# Patient Record
Sex: MALE | Race: OTHER | NOT HISPANIC OR LATINO | Employment: UNEMPLOYED | URBAN - METROPOLITAN AREA
[De-identification: names, ages, dates, MRNs, and addresses within clinical notes are randomized per-mention and may not be internally consistent; named-entity substitution may affect disease eponyms.]

---

## 2021-06-30 ENCOUNTER — APPOINTMENT (EMERGENCY)
Dept: CT IMAGING | Facility: HOSPITAL | Age: 46
End: 2021-06-30
Payer: COMMERCIAL

## 2021-06-30 ENCOUNTER — HOSPITAL ENCOUNTER (EMERGENCY)
Facility: HOSPITAL | Age: 46
Discharge: HOME | End: 2021-06-30
Attending: EMERGENCY MEDICINE | Admitting: EMERGENCY MEDICINE
Payer: COMMERCIAL

## 2021-06-30 VITALS
SYSTOLIC BLOOD PRESSURE: 141 MMHG | DIASTOLIC BLOOD PRESSURE: 82 MMHG | HEIGHT: 66 IN | OXYGEN SATURATION: 100 % | BODY MASS INDEX: 28.93 KG/M2 | HEART RATE: 63 BPM | RESPIRATION RATE: 16 BRPM | TEMPERATURE: 98.8 F | WEIGHT: 180 LBS

## 2021-06-30 DIAGNOSIS — E86.0 MILD DEHYDRATION: Primary | ICD-10-CM

## 2021-06-30 DIAGNOSIS — N17.9 AKI (ACUTE KIDNEY INJURY) (HCC): ICD-10-CM

## 2021-06-30 LAB
ALBUMIN SERPL BCP-MCNC: 4.4 G/DL (ref 3.4–4.8)
ALP SERPL-CCNC: 58.3 U/L (ref 10–129)
ALT SERPL W P-5'-P-CCNC: 18 U/L (ref 5–63)
ANION GAP SERPL CALCULATED.3IONS-SCNC: 7 MMOL/L (ref 4–13)
AST SERPL W P-5'-P-CCNC: 24 U/L (ref 15–41)
ATRIAL RATE: 59 BPM
BASOPHILS # BLD AUTO: 0.05 THOUSANDS/ΜL (ref 0–0.1)
BASOPHILS NFR BLD AUTO: 1 % (ref 0–1)
BILIRUB SERPL-MCNC: 0.72 MG/DL (ref 0.3–1.2)
BUN SERPL-MCNC: 27 MG/DL (ref 6–20)
CALCIUM SERPL-MCNC: 9.3 MG/DL (ref 8.4–10.2)
CHLORIDE SERPL-SCNC: 104 MMOL/L (ref 96–108)
CO2 SERPL-SCNC: 24 MMOL/L (ref 22–33)
CREAT SERPL-MCNC: 1.39 MG/DL (ref 0.5–1.2)
EOSINOPHIL # BLD AUTO: 0.29 THOUSAND/ΜL (ref 0–0.61)
EOSINOPHIL NFR BLD AUTO: 3 % (ref 0–6)
ERYTHROCYTE [DISTWIDTH] IN BLOOD BY AUTOMATED COUNT: 17.8 % (ref 11.6–15.1)
GFR SERPL CREATININE-BSD FRML MDRD: 61 ML/MIN/1.73SQ M
GLUCOSE SERPL-MCNC: 100 MG/DL (ref 65–140)
HCT VFR BLD AUTO: 42.9 % (ref 36.5–49.3)
HGB BLD-MCNC: 13.9 G/DL (ref 12–17)
IMM GRANULOCYTES # BLD AUTO: 0.04 THOUSAND/UL (ref 0–0.2)
IMM GRANULOCYTES NFR BLD AUTO: 1 % (ref 0–2)
LYMPHOCYTES # BLD AUTO: 2.99 THOUSANDS/ΜL (ref 0.6–4.47)
LYMPHOCYTES NFR BLD AUTO: 34 % (ref 14–44)
MCH RBC QN AUTO: 21.5 PG (ref 26.8–34.3)
MCHC RBC AUTO-ENTMCNC: 32.4 G/DL (ref 31.4–37.4)
MCV RBC AUTO: 66 FL (ref 82–98)
MONOCYTES # BLD AUTO: 0.72 THOUSAND/ΜL (ref 0.17–1.22)
MONOCYTES NFR BLD AUTO: 8 % (ref 4–12)
NEUTROPHILS # BLD AUTO: 4.78 THOUSANDS/ΜL (ref 1.85–7.62)
NEUTS SEG NFR BLD AUTO: 53 % (ref 43–75)
P AXIS: 27 DEGREES
PLATELET # BLD AUTO: 204 THOUSANDS/UL (ref 149–390)
PMV BLD AUTO: 10.1 FL (ref 8.9–12.7)
POTASSIUM SERPL-SCNC: 4.5 MMOL/L (ref 3.5–5)
PR INTERVAL: 249 MS
PROT SERPL-MCNC: 7.4 G/DL (ref 6.4–8.3)
QRS AXIS: -41 DEGREES
QRSD INTERVAL: 117 MS
QT INTERVAL: 455 MS
QTC INTERVAL: 451 MS
RBC # BLD AUTO: 6.47 MILLION/UL (ref 3.88–5.62)
SODIUM SERPL-SCNC: 135 MMOL/L (ref 133–145)
T WAVE AXIS: 4 DEGREES
TROPONIN I SERPL-MCNC: <0.03 NG/ML (ref 0–0.07)
TSH SERPL DL<=0.05 MIU/L-ACNC: 2.55 UIU/ML (ref 0.34–5.6)
VENTRICULAR RATE: 59 BPM
WBC # BLD AUTO: 8.87 THOUSAND/UL (ref 4.31–10.16)

## 2021-06-30 PROCEDURE — 93005 ELECTROCARDIOGRAM TRACING: CPT

## 2021-06-30 PROCEDURE — 99285 EMERGENCY DEPT VISIT HI MDM: CPT | Performed by: PHYSICIAN ASSISTANT

## 2021-06-30 PROCEDURE — 99285 EMERGENCY DEPT VISIT HI MDM: CPT

## 2021-06-30 PROCEDURE — G1004 CDSM NDSC: HCPCS

## 2021-06-30 PROCEDURE — 70496 CT ANGIOGRAPHY HEAD: CPT

## 2021-06-30 PROCEDURE — 84443 ASSAY THYROID STIM HORMONE: CPT | Performed by: PHYSICIAN ASSISTANT

## 2021-06-30 PROCEDURE — 80053 COMPREHEN METABOLIC PANEL: CPT | Performed by: PHYSICIAN ASSISTANT

## 2021-06-30 PROCEDURE — 84484 ASSAY OF TROPONIN QUANT: CPT | Performed by: PHYSICIAN ASSISTANT

## 2021-06-30 PROCEDURE — 96360 HYDRATION IV INFUSION INIT: CPT

## 2021-06-30 PROCEDURE — 85025 COMPLETE CBC W/AUTO DIFF WBC: CPT | Performed by: PHYSICIAN ASSISTANT

## 2021-06-30 PROCEDURE — 93010 ELECTROCARDIOGRAM REPORT: CPT | Performed by: INTERNAL MEDICINE

## 2021-06-30 PROCEDURE — 70498 CT ANGIOGRAPHY NECK: CPT

## 2021-06-30 PROCEDURE — 36415 COLL VENOUS BLD VENIPUNCTURE: CPT | Performed by: PHYSICIAN ASSISTANT

## 2021-06-30 RX ORDER — PRAVASTATIN SODIUM 20 MG
20 TABLET ORAL DAILY
COMMUNITY

## 2021-06-30 RX ORDER — AMIODARONE HYDROCHLORIDE 200 MG/1
200 TABLET ORAL DAILY
COMMUNITY

## 2021-06-30 RX ORDER — METOPROLOL TARTRATE 100 MG/1
100 TABLET ORAL EVERY 12 HOURS SCHEDULED
COMMUNITY

## 2021-06-30 RX ORDER — LISINOPRIL 10 MG/1
10 TABLET ORAL DAILY
COMMUNITY

## 2021-06-30 RX ADMIN — SODIUM CHLORIDE 1000 ML: 0.9 INJECTION, SOLUTION INTRAVENOUS at 10:14

## 2021-06-30 RX ADMIN — IOHEXOL 85 ML: 350 INJECTION, SOLUTION INTRAVENOUS at 11:27

## 2021-06-30 NOTE — ED PROVIDER NOTES
History  Chief Complaint   Patient presents with    Extremity Weakness     bilat legs weak since last night, also CO dizziness  hx afib and open heart surgery  43-year-old male with history of AFib, CAD, hyperlipidemia comes in today complaining of dizziness occasionally like he is going to pass out and bilateral leg weakness that he reports began at 4:00 p m  yesterday  He endorses a change in his speech which he describes as slow  He has not been having headaches or any difficulty walking  Denies any recent head trauma or falls  He denies any chest pain, shortness of breath, abdominal pain, nausea, vomiting, diarrhea, dysuria  Prior to Admission Medications   Prescriptions Last Dose Informant Patient Reported? Taking?   amiodarone 200 mg tablet 6/30/2021 at Unknown time Self Yes Yes   Sig: Take 200 mg by mouth daily   apixaban (ELIQUIS) 5 mg 6/30/2021 at Unknown time Self Yes Yes   Sig: Take 5 mg by mouth 2 (two) times a day   lisinopril (ZESTRIL) 10 mg tablet 6/30/2021 at Unknown time Self Yes Yes   Sig: Take 10 mg by mouth daily   metoprolol tartrate (LOPRESSOR) 100 mg tablet 6/30/2021 at Unknown time Self Yes Yes   Sig: Take 100 mg by mouth every 12 (twelve) hours   pravastatin (PRAVACHOL) 20 mg tablet 6/29/2021 at Unknown time Self Yes Yes   Sig: Take 20 mg by mouth daily      Facility-Administered Medications: None       Past Medical History:   Diagnosis Date    A-fib ([facility] [address] )     History of transfusion     Hyperlipidemia        Past Surgical History:   Procedure Laterality Date    CARDIAC SURGERY      open heart approx  5 years ago       History reviewed  No pertinent family history  I have reviewed and agree with the history as documented      E-Cigarette/Vaping    E-Cigarette Use Never User      E-Cigarette/Vaping Substances     Social History     Tobacco Use    Smoking status: Never Smoker    Smokeless tobacco: Never Used   Vaping Use    Vaping Use: Never used   Substance Use Topics    Alcohol use: Never    Drug use: Never       Review of Systems   Constitutional: Negative for fatigue and fever  HENT: Negative for ear pain, rhinorrhea and sore throat  Eyes: Negative for visual disturbance  Respiratory: Negative for cough and shortness of breath  Cardiovascular: Negative for chest pain  Gastrointestinal: Negative for abdominal pain, diarrhea, nausea and vomiting  Genitourinary: Negative for dysuria  Musculoskeletal: Negative for back pain  Skin: Negative for rash  Neurological: Positive for dizziness and weakness  Negative for headaches  Psychiatric/Behavioral: Negative for behavioral problems  Physical Exam  Physical Exam  Vitals and nursing note reviewed  Constitutional:       General: He is not in acute distress  Appearance: Normal appearance  He is not ill-appearing or toxic-appearing  HENT:      Head: Normocephalic and atraumatic  Right Ear: External ear normal       Left Ear: External ear normal    Eyes:      Extraocular Movements: Extraocular movements intact  Cardiovascular:      Rate and Rhythm: Regular rhythm  Bradycardia present  Comments: Borderline uriel  Pulmonary:      Effort: Pulmonary effort is normal  No respiratory distress  Breath sounds: Normal breath sounds  Abdominal:      General: Abdomen is flat  Bowel sounds are normal       Palpations: Abdomen is soft  Tenderness: There is no abdominal tenderness  There is no right CVA tenderness or left CVA tenderness  Musculoskeletal:         General: No signs of injury  Normal range of motion  Cervical back: Normal range of motion  Skin:     General: Skin is warm and dry  Neurological:      General: No focal deficit present  Mental Status: He is alert and oriented to person, place, and time  Sensory: No sensory deficit  Motor: No weakness        Gait: Gait normal    Psychiatric:         Mood and Affect: Mood normal          Behavior: Behavior normal          Vital Signs  ED Triage Vitals [06/30/21 0958]   Temperature Pulse Respirations Blood Pressure SpO2   98 8 °F (37 1 °C) 58 16 135/83 100 %      Temp Source Heart Rate Source Patient Position - Orthostatic VS BP Location FiO2 (%)   Oral Monitor Lying Left arm --      Pain Score       --           Vitals:    06/30/21 0958 06/30/21 1230 06/30/21 1231 06/30/21 1232   BP: 135/83 144/85 141/80 141/82   Pulse: 58 56 63    Patient Position - Orthostatic VS: Lying Sitting - Orthostatic VS Standing - Orthostatic VS Lying - Orthostatic VS         Visual Acuity      ED Medications  Medications   sodium chloride 0 9 % bolus 1,000 mL (0 mL Intravenous Stopped 6/30/21 1140)   iohexol (OMNIPAQUE) 350 MG/ML injection (SINGLE-DOSE) 100 mL (85 mL Intravenous Given 6/30/21 1127)       Diagnostic Studies  Results Reviewed     Procedure Component Value Units Date/Time    Comprehensive metabolic panel [[id number]]  (Abnormal) Collected: 06/30/21 1013    Lab Status: Final result Specimen: Blood from Arm, Right Updated: 06/30/21 1114     Sodium 135 mmol/L      Potassium 4 5 mmol/L      Chloride 104 mmol/L      CO2 24 mmol/L      ANION GAP 7 mmol/L      BUN 27 mg/dL      Creatinine 1 39 mg/dL      Glucose 100 mg/dL      Calcium 9 3 mg/dL      AST 24 U/L      ALT 18 U/L      Alkaline Phosphatase 58 3 U/L      Total Protein 7 4 g/dL      Albumin 4 4 g/dL      Total Bilirubin 0 72 mg/dL      eGFR 61 ml/min/1 73sq m     Narrative:      [place] guidelines for Chronic Kidney Disease (CKD):     Stage 1 with normal or high GFR (GFR > 90 mL/min/1 73 square meters)    Stage 2 Mild CKD (GFR = 60-89 mL/min/1 73 square meters)    Stage 3A Moderate CKD (GFR = 45-59 mL/min/1 73 square meters)    Stage 3B Moderate CKD (GFR = 30-44 mL/min/1 73 square meters)    Stage 4 Severe CKD (GFR = 15-29 mL/min/1 73 square meters)    Stage 5 End Stage CKD (GFR <15 mL/min/1 73 square meters)  Note: GFR calculation is accurate only with a steady state creatinine    TSH [[id number]]  (Normal) Collected: 06/30/21 1013    Lab Status: Final result Specimen: Blood from Arm, Right Updated: 06/30/21 1054     TSH 3RD GENERATON 2 554 uIU/mL     Narrative:      Patients undergoing fluorescein dye angiography may retain small amounts of fluorescein in the body for 48-72 hours post procedure  Samples containing fluorescein can produce falsely depressed TSH values  If the patient had this procedure,a specimen should be resubmitted post fluorescein clearance  Troponin I [[id number]]  (Normal) Collected: 06/30/21 1013    Lab Status: Final result Specimen: Blood from Arm, Right Updated: 06/30/21 1042     Troponin I <0 03 ng/mL     CBC and differential [[id number]]  (Abnormal) Collected: 06/30/21 1013    Lab Status: Final result Specimen: Blood from Arm, Right Updated: 06/30/21 1021     WBC 8 87 Thousand/uL      RBC 6 47 Million/uL      Hemoglobin 13 9 g/dL      Hematocrit 42 9 %      MCV 66 fL      MCH 21 5 pg      MCHC 32 4 g/dL      RDW 17 8 %      MPV 10 1 fL      Platelets 328 Thousands/uL      Neutrophils Relative 53 %      Immat GRANS % 1 %      Lymphocytes Relative 34 %      Monocytes Relative 8 %      Eosinophils Relative 3 %      Basophils Relative 1 %      Neutrophils Absolute 4 78 Thousands/µL      Immature Grans Absolute 0 04 Thousand/uL      Lymphocytes Absolute 2 99 Thousands/µL      Monocytes Absolute 0 72 Thousand/µL      Eosinophils Absolute 0 29 Thousand/µL      Basophils Absolute 0 05 Thousands/µL                  CTA head and neck with and without contrast   Final Result by Echo Constantino DO (06/30 1212)      CT brain: No acute intracranial abnormality  CT angiography: No stenosis, occlusion or thrombosis of the cervical vasculature  Right vertebral artery is mildly hypoplastic but patent and consistent in caliber                    Workstation performed: [id number]                    Procedures  Procedures ED Course  ED Course as of Jul 01 0943 Wed Jun 30, 2021   1005 EKG performed at 9:55 a m  Interpreted by me shows sinus bradycardia with a rate of 59 beats per minute, left axis deviation related to left ventricular hypertrophy, first-degree AV block, no significant ST elevations      1155 Patient reports he is feeling better      1223 Baseline around 1 1   Creatinine(!): 1 39   1224 6 months ago was 79   eGFR: 61                 Stroke Assessment     Row Name 06/30/21 0952             NIH Stroke Scale    Interval  --      Level of Consciousness (1a )  0      LOC Questions (1b )  0      LOC Commands (1c )  0      Best Gaze (2 )  0      Visual (3 )  0      Facial Palsy (4 )  0      Motor Arm, Left (5a )  0      Motor Arm, Right (5b )  0      Motor Leg, Left (6a )  0      Motor Leg, Right (6b )  0      Limb Ataxia (7 )  0      Sensory (8 )  0      Best Language (9 )  0      Dysarthria (10 )  0      Extinction and Inattention (11 ) (Formerly Neglect)  0      Total  0          First Filed Value   TPA Decision  Patient not a TPA candidate  Patient is not a candidate options  Symptoms resolved/clearly non disabling  MDM  Number of Diagnoses or Management Options  LEANNE (acute kidney injury) ([facility] [address] )  Mild dehydration  Diagnosis management comments: 73-year-old male with no focal deficits on exam coming in with complaints of leg weakness and lightheadedness that began yesterday  Head CT does not show any signs of acute stroke  Patient is feeling much better after IV fluids  His creatinine was mildly elevated  Patient did not feel he needed to stay in the hospital   Patient advised to go home and follow-up with his PCP to have his creatinine rechecked  Advised to hydrate until his urine is clear  Portions of the record may have been created with voice recognition software   Occasional wrong word or "sound a like" substitutions may have occurred due to the inherent limitations of voice recognition software  Read the chart carefully and recognize, using context, where substitutions have occurred  Disposition  Final diagnoses:   Mild dehydration   LEANNE (acute kidney injury) ([address] )     Time reflects when diagnosis was documented in both MDM as applicable and the Disposition within this note     Time User Action Codes Description Comment    6/30/2021 12:25 PM Lancaster Harder Add [E86 0] Mild dehydration     6/30/2021 12:25 PM Lancaster Harder Add [N17 9] LEANNE (acute kidney injury) [facility])       ED Disposition     ED Disposition Condition Date/Time Comment    Discharge Stable Wed Jun 30, 2021 12:31 PM Luis F Villanueva discharge to home/self care  Follow-up Information     Follow up With Specialties Details Why Contact Info Additional Information    Ailyn Chilel MD Cardiology Schedule an appointment as soon as possible for a visit   [address] [facility] Medicine Schedule an appointment as soon as possible for a visit   [address] [phone]  [address] , [place], [place], [address]    [address]  Call  As needed [phone]             Discharge Medication List as of 6/30/2021 12:31 PM      CONTINUE these medications which have NOT CHANGED    Details   amiodarone 200 mg tablet Take 200 mg by mouth daily, Historical Med      apixaban (ELIQUIS) 5 mg Take 5 mg by mouth 2 (two) times a day, Historical Med      lisinopril (ZESTRIL) 10 mg tablet Take 10 mg by mouth daily, Historical Med      metoprolol tartrate (LOPRESSOR) 100 mg tablet Take 100 mg by mouth every 12 (twelve) hours, Historical Med      pravastatin (PRAVACHOL) 20 mg tablet Take 20 mg by mouth daily, Historical Med           No discharge procedures on file      PDMP Review     None          ED Provider  Electronically Signed by           Ezekiel Cabello JEFF  07/01/21 [address], JEFF  07/01/21 6019

## 2022-11-05 ENCOUNTER — OFFICE VISIT (OUTPATIENT)
Dept: URGENT CARE | Facility: CLINIC | Age: 47
End: 2022-11-05

## 2022-11-05 VITALS
HEIGHT: 66 IN | WEIGHT: 185 LBS | OXYGEN SATURATION: 99 % | SYSTOLIC BLOOD PRESSURE: 133 MMHG | HEART RATE: 65 BPM | DIASTOLIC BLOOD PRESSURE: 75 MMHG | BODY MASS INDEX: 29.73 KG/M2

## 2022-11-05 DIAGNOSIS — R11.0 NAUSEA: Primary | ICD-10-CM

## 2022-11-05 RX ORDER — ONDANSETRON 4 MG/1
4 TABLET, ORALLY DISINTEGRATING ORAL EVERY 6 HOURS PRN
Qty: 5 TABLET | Refills: 0 | Status: SHIPPED | OUTPATIENT
Start: 2022-11-05

## 2022-11-05 NOTE — PROGRESS NOTES
[employer]        NAME: Mariela Hernández is a 56 y o  male  : 1975    MRN: [id number]  DATE: 2022  TIME: 10:23 AM    Assessment and Plan   Nausea [R11 0]  1  Nausea  ondansetron (Zofran ODT) 4 mg disintegrating tablet         Patient Instructions       Follow up with PCP in 3-5 days  Proceed to  ER if symptoms worsen  Chief Complaint     Chief Complaint   Patient presents with   • Nausea     Nausea started yesterday, no other sympts         History of Present Illness       HPI  Patient presents complaining of nausea that started yesterday  He has a history of open heart surgery with implantable cardiac device  Patient does not have any abdominal pain fevers chills or vomiting    Review of Systems   Review of Systems  Per HPI    Current Medications       Current Outpatient Medications:   •  amiodarone 200 mg tablet, Take 200 mg by mouth daily, Disp: , Rfl:   •  apixaban (ELIQUIS) 5 mg, Take 5 mg by mouth 2 (two) times a day, Disp: , Rfl:   •  lisinopril (ZESTRIL) 10 mg tablet, Take 10 mg by mouth daily, Disp: , Rfl:   •  metoprolol tartrate (LOPRESSOR) 100 mg tablet, Take 100 mg by mouth every 12 (twelve) hours, Disp: , Rfl:   •  ondansetron (Zofran ODT) 4 mg disintegrating tablet, Take 1 tablet (4 mg total) by mouth every 6 (six) hours as needed for nausea or vomiting, Disp: 5 tablet, Rfl: 0  •  pravastatin (PRAVACHOL) 20 mg tablet, Take 20 mg by mouth daily, Disp: , Rfl:     Current Allergies     Allergies as of 2022   • (No Known Allergies)            The following portions of the patient's history were reviewed and updated as appropriate: allergies, current medications, past family history, past medical history, past social history, past surgical history and problem list      Past Medical History:   Diagnosis Date   • A-fib ([address] )    • History of transfusion    • Hyperlipidemia        Past Surgical History:   Procedure Laterality Date   • CARDIAC SURGERY      open heart approx  5 years ago       No family history on file  Medications have been verified  Objective   /75   Pulse 65   Ht 5' 6" (1 676 m)   Wt 83 9 kg (185 lb)   SpO2 99%   BMI 29 86 kg/m²   No LMP for male patient  Physical Exam     Physical Exam  Constitutional:       General: He is not in acute distress  Appearance: He is well-developed  He is not diaphoretic  HENT:      Head: Normocephalic and atraumatic  Right Ear: Tympanic membrane and external ear normal       Left Ear: Tympanic membrane and external ear normal       Mouth/Throat:      Mouth: Mucous membranes are moist       Pharynx: Oropharynx is clear  No oropharyngeal exudate  Eyes:      Extraocular Movements: Extraocular movements intact  Conjunctiva/sclera: Conjunctivae normal    Cardiovascular:      Rate and Rhythm: Normal rate and regular rhythm  Heart sounds: Normal heart sounds  Pulmonary:      Effort: Pulmonary effort is normal  No respiratory distress  Breath sounds: Normal breath sounds  No wheezing  Abdominal:      General: Bowel sounds are normal  There is no distension  Palpations: Abdomen is soft  There is no mass  Tenderness: There is no abdominal tenderness  Musculoskeletal:         General: No swelling or tenderness  Cervical back: Normal range of motion  Right lower leg: No edema  Left lower leg: No edema  Lymphadenopathy:      Cervical: No cervical adenopathy  Skin:     General: Skin is warm  Neurological:      Mental Status: He is alert and oriented to person, place, and time

## 2023-07-01 ENCOUNTER — HOSPITAL ENCOUNTER (EMERGENCY)
Facility: HOSPITAL | Age: 48
Discharge: HOME | End: 2023-07-01
Attending: EMERGENCY MEDICINE
Payer: COMMERCIAL

## 2023-07-01 VITALS
RESPIRATION RATE: 16 BRPM | HEART RATE: 61 BPM | TEMPERATURE: 98.5 F | SYSTOLIC BLOOD PRESSURE: 132 MMHG | OXYGEN SATURATION: 99 % | DIASTOLIC BLOOD PRESSURE: 88 MMHG

## 2023-07-01 DIAGNOSIS — R25.2 LEG CRAMPS: ICD-10-CM

## 2023-07-01 DIAGNOSIS — K08.89 PAIN, DENTAL: Primary | ICD-10-CM

## 2023-07-01 LAB
ANION GAP SERPL CALCULATED.3IONS-SCNC: 5 MMOL/L
BUN SERPL-MCNC: 17 MG/DL (ref 5–25)
CALCIUM SERPL-MCNC: 9.2 MG/DL (ref 8.4–10.2)
CHLORIDE SERPL-SCNC: 108 MMOL/L (ref 96–108)
CO2 SERPL-SCNC: 24 MMOL/L (ref 21–32)
CREAT SERPL-MCNC: 1 MG/DL (ref 0.6–1.3)
GFR SERPL CREATININE-BSD FRML MDRD: 89 ML/MIN/1.73SQ M
GLUCOSE SERPL-MCNC: 103 MG/DL (ref 65–140)
POTASSIUM SERPL-SCNC: 4.5 MMOL/L (ref 3.5–5.3)
SODIUM SERPL-SCNC: 137 MMOL/L (ref 135–147)

## 2023-07-01 PROCEDURE — 80048 BASIC METABOLIC PNL TOTAL CA: CPT | Performed by: PHYSICIAN ASSISTANT

## 2023-07-01 PROCEDURE — 99282 EMERGENCY DEPT VISIT SF MDM: CPT

## 2023-07-01 PROCEDURE — 36415 COLL VENOUS BLD VENIPUNCTURE: CPT | Performed by: PHYSICIAN ASSISTANT

## 2023-07-01 PROCEDURE — 96360 HYDRATION IV INFUSION INIT: CPT

## 2023-07-01 RX ADMIN — SODIUM CHLORIDE 1000 ML: 0.9 INJECTION, SOLUTION INTRAVENOUS at 11:42

## 2023-07-01 NOTE — DISCHARGE INSTRUCTIONS
Encourage fluids    Contact your insurance company Monday to inquire about other oral surgeons covered by your insurance  May also contact your dentist for help in expediting follow up appointment with oral surgeon    Continue amoxicillin    Tylenol for pain     Return to ED for increased pain, fever, facial swelling/redness.

## 2023-07-01 NOTE — ED PROVIDER NOTES
History  Chief Complaint   Patient presents with   • Dental Pain     PT presents to ED from home w/ dental pain for weeks. PT needs tooth extracted and has appt in Oct for extraction. Patient is a 45-year-old [place] male who has 2 complaints. First, patient reports couple week history of right maxillary molar tooth pain. Was seen by his dentist.  Was referred to oral surgeon. Patient reports he is unable to be seen until October. Currently on amoxicillin twice daily. No facial swelling or redness. No fever. Secondly, patient reports cramping in both thighs and calves beginning last night. States she drinks plenty of fluids. No chest pain, shortness of breath, abdominal pain. No leg weakness or numbness. Prior to Admission Medications   Prescriptions Last Dose Informant Patient Reported? Taking?   amiodarone 200 mg tablet  Self Yes No   Sig: Take 200 mg by mouth daily   apixaban (ELIQUIS) 5 mg  Self Yes No   Sig: Take 5 mg by mouth 2 (two) times a day   lisinopril (ZESTRIL) 10 mg tablet  Self Yes No   Sig: Take 10 mg by mouth daily   metoprolol tartrate (LOPRESSOR) 100 mg tablet  Self Yes No   Sig: Take 100 mg by mouth every 12 (twelve) hours   ondansetron (Zofran ODT) 4 mg disintegrating tablet   No No   Sig: Take 1 tablet (4 mg total) by mouth every 6 (six) hours as needed for nausea or vomiting   pravastatin (PRAVACHOL) 20 mg tablet  Self Yes No   Sig: Take 20 mg by mouth daily      Facility-Administered Medications: None       Past Medical History:   Diagnosis Date   • A-fib ([address])    • History of transfusion    • Hyperlipidemia        Past Surgical History:   Procedure Laterality Date   • CARDIAC SURGERY      open heart approx. 5 years ago       History reviewed. No pertinent family history. I have reviewed and agree with the history as documented.     E-Cigarette/Vaping   • E-Cigarette Use Never User      E-Cigarette/Vaping Substances     Social History     Tobacco Use   • Smoking status: Never   • Smokeless tobacco: Never   Vaping Use   • Vaping Use: Never used   Substance Use Topics   • Alcohol use: Never   • Drug use: Never       Review of Systems   Constitutional: Negative for chills and fever. HENT: Negative for ear pain and sore throat. Respiratory: Negative for cough and shortness of breath. Cardiovascular: Negative for chest pain and palpitations. Gastrointestinal: Negative for abdominal pain and vomiting. Genitourinary: Negative for dysuria and hematuria. Musculoskeletal: Negative for arthralgias, back pain and neck pain. Skin: Negative for color change and rash. Neurological: Negative for syncope. All other systems reviewed and are negative. Physical Exam  Physical Exam  Vitals and nursing note reviewed. Constitutional:       General: He is not in acute distress. Appearance: Normal appearance. He is not ill-appearing, toxic-appearing or diaphoretic. HENT:      Head: Normocephalic and atraumatic. Right Ear: Tympanic membrane, ear canal and external ear normal.      Left Ear: Tympanic membrane, ear canal and external ear normal.      Nose: Nose normal.      Mouth/Throat:      Mouth: Mucous membranes are moist.      Pharynx: Oropharynx is clear. Comments: Tenderness to percussion of right maxillary third molar tooth. No gingival abscess. No facial redness or swelling. Eyes:      Extraocular Movements: Extraocular movements intact. Conjunctiva/sclera: Conjunctivae normal.      Pupils: Pupils are equal, round, and reactive to light. Cardiovascular:      Rate and Rhythm: Normal rate and regular rhythm. Pulses: Normal pulses. Heart sounds: Normal heart sounds. Comments: Well-healed sternotomy incision  Pulmonary:      Effort: Pulmonary effort is normal.      Breath sounds: Normal breath sounds. Abdominal:      General: Abdomen is flat. Bowel sounds are normal.      Palpations: Abdomen is soft.    Musculoskeletal:         General: Normal range of motion. Cervical back: Normal range of motion and neck supple. Comments: No calf tenderness bilaterally. No leg swelling bilaterally   Skin:     General: Skin is warm and dry. Capillary Refill: Capillary refill takes less than 2 seconds. Neurological:      General: No focal deficit present. Mental Status: He is alert and oriented to person, place, and time. Mental status is at baseline.          Vital Signs  ED Triage Vitals [07/01/23 1056]   Temperature Pulse Respirations Blood Pressure SpO2   98.5 °F (36.9 °C) 61 16 132/88 99 %      Temp Source Heart Rate Source Patient Position - Orthostatic VS BP Location FiO2 (%)   Oral -- -- -- --      Pain Score       --           Vitals:    07/01/23 1056   BP: 132/88   Pulse: 61         Visual Acuity      ED Medications  Medications   sodium chloride 0.9 % bolus 1,000 mL (1,000 mL Intravenous New Bag 7/1/23 1142)       Diagnostic Studies  Results Reviewed     Procedure Component Value Units Date/Time    Basic metabolic panel [[id number]] Collected: 07/01/23 1142    Lab Status: Final result Specimen: Blood from Arm, Left Updated: 07/01/23 1210     Sodium 137 mmol/L      Potassium 4.5 mmol/L      Chloride 108 mmol/L      CO2 24 mmol/L      ANION GAP 5 mmol/L      BUN 17 mg/dL      Creatinine 1.00 mg/dL      Glucose 103 mg/dL      Calcium 9.2 mg/dL      eGFR 89 ml/min/1.73sq m     Narrative:      [facility] guidelines for [facility] (CKD):   •  Stage 1 with normal or high GFR (GFR > 90 mL/min/1.73 square meters)  •  Stage 2 Mild CKD (GFR = 60-89 mL/min/1.73 square meters)  •  Stage 3A Moderate CKD (GFR = 45-59 mL/min/1.73 square meters)  •  Stage 3B Moderate CKD (GFR = 30-44 mL/min/1.73 square meters)  •  Stage 4 Severe CKD (GFR = 15-29 mL/min/1.73 square meters)  •  Stage 5 End Stage CKD (GFR <15 mL/min/1.73 square meters)  Note: GFR calculation is accurate only with a steady state creatinine No orders to display              Procedures  Procedures         ED Course                                             Medical Decision Making  55 yo [place] male with couple week history of R maxillary molar tooth pain No gingival abscess. No signs of facial cellulitis. Patient to continue amoxicillin that he is already taking. Labs reviewed with no electrolyte abnormality to explain leg cramping felt last night. Encouraged fluids. Advise follow-up with primary care provider for any persisting concerns. Advised to contact insurance company Monday to see about other oral surgeons that might be covered under his plan. Return precautions given    Amount and/or Complexity of Data Reviewed  Labs: ordered. Disposition  Final diagnoses:   Pain, dental   Leg cramps     Time reflects when diagnosis was documented in both MDM as applicable and the Disposition within this note     Time User Action Codes Description Comment    7/1/2023 12:30 PM Kelsie Garcia Add [K08.89] Pain, dental     7/1/2023 12:30 PM Kelsie Garcia Add [R25.2] Leg cramps       ED Disposition     ED Disposition   Discharge    Condition   Stable    Date/Time   Sat Jul 1, 2023 12:30 PM    Comment   Luis F Villanueva discharge to home/self care.                Follow-up Information    None         Patient's Medications   Discharge Prescriptions    No medications on file           PDMP Review     None          ED Provider  Electronically Signed by           Byron Alexander PA-C  07/01/23 1406